# Patient Record
Sex: FEMALE | Race: WHITE
[De-identification: names, ages, dates, MRNs, and addresses within clinical notes are randomized per-mention and may not be internally consistent; named-entity substitution may affect disease eponyms.]

---

## 2017-11-27 ENCOUNTER — HOSPITAL ENCOUNTER (OUTPATIENT)
Dept: HOSPITAL 92 - ULT | Age: 62
Discharge: HOME | End: 2017-11-27
Attending: PHYSICIAN ASSISTANT
Payer: COMMERCIAL

## 2017-11-27 DIAGNOSIS — K80.20: ICD-10-CM

## 2017-11-27 DIAGNOSIS — K76.89: ICD-10-CM

## 2017-11-27 DIAGNOSIS — N21.0: ICD-10-CM

## 2017-11-27 DIAGNOSIS — R74.8: Primary | ICD-10-CM

## 2017-11-27 PROCEDURE — 76700 US EXAM ABDOM COMPLETE: CPT

## 2017-11-27 NOTE — ULT
ABDOMEN ULTRASOUND COMPLETE:

 

Date:  11/27/17 

 

HISTORY:  

Elevated LFTs. 

 

COMPARISON:  

None. 

 

TECHNIQUE:  

Utilizing a multihertz transducer, sonographic imaging of the abdomen is performed in the longitudina
l and transverse plane. 

 

FINDINGS:

Visualized aorta has a normal caliber measuring 1.8 cm. Visualized IVC is unremarkable. The head and 
proximal pancreatic body have a normal echotexture. Remainder of the pancreas is obscured. 

 

Heterogeneous echotexture of the liver. Right hepatic lobe measures 14.6 cm. Limited evaluation for h
epatic masses and intrahepatic biliary dilatation. 

 

Multiple echogenic foci in the lumen of the gallbladder, compatible with gallstones. Largest gallston
e measures 1.8 x 1.5 x 1.9 cm. Gallbladder wall is not thickened. No pericholecystic fluid. Negative 
Weir's sign. 

 

Common bile duct diameter is 0.4 cm. 

 

Right and left kidney have a normal cortical echotexture. Bilaterally, no hydronephrosis. Right kidne
y measures 8.2 x 4.6 x 5.2 cm. Left kidney measures 9.5 x 4.8 x 4.1 cm. There is scarring in the left
 upper pole. 

 

Spleen has a normal echotexture, measuring 7.4 cm. 

 

 

There appears to be a calcification in the dependent portion of the urinary bladder measuring 1.5 x 1
.0 x 1.6 cm. 

 

IMPRESSION: 

1.  Heterogeneous hepatic parenchymal echotexture. If there is concern for hepatic masses, consider l
iver mass protocol CT. 

 

2.  Sonographic evidence of cholelithiasis without evidence of cholecystitis. 

 

3.  Bladder calculus. 

 

 

POS: [facility]

## 2018-01-08 ENCOUNTER — HOSPITAL ENCOUNTER (OUTPATIENT)
Dept: HOSPITAL 92 - ULT | Age: 63
Discharge: HOME | End: 2018-01-08
Attending: INTERNAL MEDICINE
Payer: COMMERCIAL

## 2018-01-08 VITALS — BODY MASS INDEX: 28.1 KG/M2

## 2018-01-08 VITALS — SYSTOLIC BLOOD PRESSURE: 125 MMHG | TEMPERATURE: 98.4 F | DIASTOLIC BLOOD PRESSURE: 81 MMHG

## 2018-01-08 DIAGNOSIS — M06.9: ICD-10-CM

## 2018-01-08 DIAGNOSIS — Z88.6: ICD-10-CM

## 2018-01-08 DIAGNOSIS — Z79.899: ICD-10-CM

## 2018-01-08 DIAGNOSIS — Z96.643: ICD-10-CM

## 2018-01-08 DIAGNOSIS — Z87.891: ICD-10-CM

## 2018-01-08 DIAGNOSIS — Z98.890: ICD-10-CM

## 2018-01-08 DIAGNOSIS — I10: ICD-10-CM

## 2018-01-08 DIAGNOSIS — K73.9: Primary | ICD-10-CM

## 2018-01-08 DIAGNOSIS — K21.9: ICD-10-CM

## 2018-01-08 LAB
APTT PPP: 34 SEC (ref 22.9–36.1)
HGB BLD-MCNC: 13.5 G/DL (ref 12–16)
INR PPP: 1
MCH RBC QN AUTO: 29.1 PG (ref 27–31)
MCV RBC AUTO: 86.5 FL (ref 81–99)
PLATELET # BLD AUTO: 321 THOU/UL (ref 130–400)
PROTHROMBIN TIME: 13.2 SEC (ref 12–14.7)
RBC # BLD AUTO: 4.65 MILL/UL (ref 4.2–5.4)
WBC # BLD AUTO: 5.6 THOU/UL (ref 4.8–10.8)

## 2018-01-08 PROCEDURE — 88313 SPECIAL STAINS GROUP 2: CPT

## 2018-01-08 PROCEDURE — 85027 COMPLETE CBC AUTOMATED: CPT

## 2018-01-08 PROCEDURE — 76942 ECHO GUIDE FOR BIOPSY: CPT

## 2018-01-08 PROCEDURE — 88307 TISSUE EXAM BY PATHOLOGIST: CPT

## 2018-01-08 PROCEDURE — 47000 NEEDLE BIOPSY OF LIVER PERQ: CPT

## 2018-01-08 PROCEDURE — 36415 COLL VENOUS BLD VENIPUNCTURE: CPT

## 2018-01-08 PROCEDURE — 99153 MOD SED SAME PHYS/QHP EA: CPT

## 2018-01-08 PROCEDURE — 85730 THROMBOPLASTIN TIME PARTIAL: CPT

## 2018-01-08 PROCEDURE — 99152 MOD SED SAME PHYS/QHP 5/>YRS: CPT

## 2018-01-08 PROCEDURE — 0FB23ZX EXCISION OF LEFT LOBE LIVER, PERCUTANEOUS APPROACH, DIAGNOSTIC: ICD-10-PCS | Performed by: INTERNAL MEDICINE

## 2018-01-08 PROCEDURE — 85610 PROTHROMBIN TIME: CPT

## 2018-01-08 NOTE — ULT
ULTRASOUND GUIDED HEPATIC BIPOSY:

 

INDICATION: 

Abnormal LFTs.

 

TECHNIQUE: 

Informed consent was obtained.  Preprocedure ultrasound was performed for guidance purposes.  A site 
overlying the left hepatic lobe was marked.  This site was prepped and draped in the usual sterile fa
shion.  The patient under conscious sedation under guidance of the radiology nurse.  The patient rece
ived 50 mcg of IV Fentanyl and 1 mg of IV Versed.  The site was prepped and draped in the usual steri
le fashion.  Buffered 1% lidocaine was administered to the overlying subcutaneous tissues, abdominal 
musculature, and overlying liver capsule.  A small incision was made within the anterior midline abdo
lisy wall.  A 17-gauge Trocar needle was guided down into the left hepatic lobe.  The inner stylette
 was removed.  An 18-gauge core biopsy device was introduced into the trocar.  One sample was obtaine
d into the left hepatic lobe.  The core sample was proved to be adequate upon placement within the sp
ecimen cup.  The inner stylette was replaced.  The needle was removed.  Pressure was held at the biop
sy site until hemostasis was obtained.  Postprocedural ultrasound images demonstrated no significant 
intraparenchymal hematoma.  The patient tolerated the procedure without difficulty.

 

IMPRESSION: 

Successful ultrasound-guided nonfocal hepatic biopsy.

 

POS: [facility]